# Patient Record
Sex: MALE | Race: WHITE
[De-identification: names, ages, dates, MRNs, and addresses within clinical notes are randomized per-mention and may not be internally consistent; named-entity substitution may affect disease eponyms.]

---

## 2019-01-29 ENCOUNTER — HOSPITAL ENCOUNTER (EMERGENCY)
Dept: HOSPITAL 56 - MW.ED | Age: 34
Discharge: HOME | End: 2019-01-29
Payer: COMMERCIAL

## 2019-01-29 DIAGNOSIS — S01.312A: Primary | ICD-10-CM

## 2019-01-29 DIAGNOSIS — Z23: ICD-10-CM
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
## 2019-01-29 DIAGNOSIS — F17.210: ICD-10-CM

## 2019-01-29 DIAGNOSIS — Z79.899: ICD-10-CM

## 2019-01-29 PROCEDURE — 90715 TDAP VACCINE 7 YRS/> IM: CPT

## 2019-01-29 PROCEDURE — 12011 RPR F/E/E/N/L/M 2.5 CM/<: CPT

## 2019-01-29 PROCEDURE — 99282 EMERGENCY DEPT VISIT SF MDM: CPT

## 2019-01-29 PROCEDURE — 90471 IMMUNIZATION ADMIN: CPT

## 2019-01-29 NOTE — EDM.PDOC
ED HPI GENERAL MEDICAL PROBLEM





- General


Chief Complaint: Laceration


Stated Complaint: INJURY TO EAR


Time Seen by Provider: 01/29/19 14:37





- History of Present Illness


INITIAL COMMENTS - FREE TEXT/NARRATIVE: 


HISTORY AND PHYSICAL:





History of present illness:


Patient is a 33-year-old white male presents with concern of laceration is left 

[place] occurred when he was working underneath a car. He is unsure of his 

tetanus status this is to be determined





Review of systems: 


As per history of present illness and below otherwise all systems reviewed and 

negative.





Past medical history: 


As per history of present illness and as reviewed below otherwise 

noncontributory.





Surgical history: 


As per history of present illness and as reviewed below otherwise 

noncontributory.





Social history: 


No reported history of drug or alcohol abuse.





Family history: 


As per history of present illness and as reviewed below otherwise 

noncontributory.





Physical exam:


HEENT: Patient has approximately 1 cm superficial laceration to his left 

earlobe is good hemostasis, normocephalic, pupils reactive, negative for 

conjunctival pallor or scleral icterus, mucous membranes moist, throat clear, 

neck supple, nontender, trachea midline.


Lungs: Clear to auscultation, breath sounds equal bilaterally, chest nontender.


Heart: S1S2, regular, negative for clicks, rubs, or JVD.


Abdomen: Soft, nondistended, nontender. Negative for masses or 

hepatosplenomegaly. Negative for costovertebral tenderness.


Pelvis: Stable nontender.


Genitourinary: Deferred.


Rectal: Deferred.


Extremities: Atraumatic, negative for cords or calf pain. Neurovascular 

unremarkable.


Neuro: Awake, alert, oriented. Cranial nerves II through XII unremarkable. 

Cerebellum unremarkable. Motor and sensory unremarkable throughout. Exam 

nonfocal.





Diagnostics:


None





Therapeutics:


Wound was irrigated and closed with Dermabond adhesive





Impression: 


#1 left ear injury ( laceration)





Definitive disposition and diagnosis as appropriate pending reevaluation and 

review of above.





  ** Left Ear


Pain Score (Numeric/FACES): 1





- Related Data


 Allergies











Allergy/AdvReac Type Severity Reaction Status Date / Time


 


No Known Allergies Allergy   Verified 01/29/19 13:56











Home Meds: 


 Home Meds





Metoprolol Succinate [Toprol XL] 25 mg PO DAILY 01/29/19 [History]











Past Medical History


Cardiovascular History: Reports: Other (See Below)


Other Cardiovascular History: mitral valve prolapse





- Infectious Disease History


Infectious Disease History: Reports: Chicken Pox





Social & Family History





- Family History


Family Medical History: Noncontributory





- Tobacco Use


Smoking Status *Q: Current Every Day Smoker


Years of Tobacco use: 10


Packs/Tins Daily: 1





- Recreational Drug Use


Recreational Drug Use: No





ED ROS GENERAL





- Review of Systems


Review Of Systems: ROS reveals no pertinent complaints other than HPI.





ED EXAM, SKIN/RASH


Exam: See Below (See dictation)





Course





- Vital Signs


Last Recorded V/S: 





 Last Vital Signs











Temp  36.4 C   01/29/19 13:53


 


Pulse  53 L  01/29/19 13:53


 


Resp  16   01/29/19 13:53


 


BP  128/74   01/29/19 13:53


 


Pulse Ox  97   01/29/19 13:53














Departure





- Departure


Time of Disposition: 14:40


Disposition: Home, Self-Care 01


Condition: Good


Clinical Impression: 


 Laceration








- Discharge Information


Referrals: 


PCP,None [Primary Care Provider] - 


Additional Instructions: 


The following information is given to patients seen in the emergency department 

who are being discharged to home. This information is to outline your options 

for follow-up care. We provide all patients seen in our emergency department 

with a follow-up referral.





The need for follow-up, as well as the timing and circumstances, are variable 

depending upon the specifics of your emergency department visit.





If you don't have a primary care physician on staff, we will provide you with a 

referral. We always advise you to contact your personal physician following an 

emergency department visit to inform them of the circumstance of the visit and 

for follow-up with them and/or the need for any referrals to a consulting 

specialist.





The emergency department will also refer you to a specialist when appropriate. 

This referral assures that you have the opportunity for followup care with a 

specialist. All of these measure are taken in an effort to provide you with 

optimal care, which includes your followup.





Under all circumstances we always encourage you to contact your private 

physician who remains a resource for coordinating  your care. When calling for 

followup care, please make the office aware that this follow-up is from your 

recent emergency room visit. If for any reason you are refused follow-up, 

please contact the [facility] emergency department at [phone] 

and asked to speak to the emergency department charge nurse.

















Follow-up primary medical doctor as needed as discussed return as needed as 

discussed

## 2019-07-08 ENCOUNTER — HOSPITAL ENCOUNTER (EMERGENCY)
Dept: HOSPITAL 56 - MW.ED | Age: 34
Discharge: HOME | End: 2019-07-08
Payer: COMMERCIAL

## 2019-07-08 DIAGNOSIS — F17.210: ICD-10-CM

## 2019-07-08 DIAGNOSIS — R07.89: Primary | ICD-10-CM

## 2019-07-08 DIAGNOSIS — Z79.899: ICD-10-CM

## 2019-07-08 LAB
CHLORIDE SERPL-SCNC: 105 MMOL/L (ref 98–107)
SODIUM SERPL-SCNC: 141 MMOL/L (ref 136–148)

## 2019-07-08 PROCEDURE — 82550 ASSAY OF CK (CPK): CPT

## 2019-07-08 PROCEDURE — 71045 X-RAY EXAM CHEST 1 VIEW: CPT

## 2019-07-08 PROCEDURE — 84484 ASSAY OF TROPONIN QUANT: CPT

## 2019-07-08 PROCEDURE — 96374 THER/PROPH/DIAG INJ IV PUSH: CPT

## 2019-07-08 PROCEDURE — 85025 COMPLETE CBC W/AUTO DIFF WBC: CPT

## 2019-07-08 PROCEDURE — 80053 COMPREHEN METABOLIC PANEL: CPT

## 2019-07-08 PROCEDURE — 93005 ELECTROCARDIOGRAM TRACING: CPT

## 2019-07-08 PROCEDURE — 99285 EMERGENCY DEPT VISIT HI MDM: CPT

## 2019-07-08 PROCEDURE — 96361 HYDRATE IV INFUSION ADD-ON: CPT

## 2019-07-08 NOTE — EDM.PDOC
ED HPI GENERAL MEDICAL PROBLEM





- General


Chief Complaint: Chest Pain


Stated Complaint: CHEST PAIN


Time Seen by Provider: 07/08/19 00:54





- History of Present Illness


INITIAL COMMENTS - FREE TEXT/NARRATIVE: 





HISTORY AND PHYSICAL:





History of present illness:


The patient is a 34-year-old male who presents with complaints of left upper 

outer chest wall pain that started about 3 hours ago at rest. He says that he 

had [place] 

normally and did drink alcohol today and says he came home was resting when the 

pain started. It does not radiate to his arm and is not associated with nausea 

vomiting abdominal pain shortness of breath or diaphoresis. He rates it as a 7/

10 and he did not take any medication other than Tylenol before coming here. 

The patient has a significant past medical history in that he had multiple 

tests done when he was 19 years of age for syncope including stress tests and 

was diagnosed with mitral valve prolapse. Subsequently he also had a Holter 

monitor due to palpitations which was also normal. He has never had a heart 

catheter. He says that approximately one year ago he went to the ER and [place] and was evaluated in the ER but not admitted to the hospital and was told 

that he had formation of his heart muscle and he was placed on some pain 

medications and steroids and he got better. He said that while he was in the ED 

his pain went away completely. He says he has not followed up with his provider 

recently out in [place] nor has his cardiologist. The patient has no 

significant family history and has a social history significant for one pack a 

day smoking but no drug use and daily alcohol use but not in excess. He Has no 

abdominal complaints such as upper abdominal discomfort dyspepsia nausea or 

vomiting and has no history of food intolerance. He says that the pain he is 

experiencing now is very similar to pain he has had in the past but because it 

didn't seem to go away after a short time he thought he should be checked out. 

The pain came on gradually and was not sudden in onset and he describes it as a 

deep aching annoying pain





Review of systems: 


As per history of present illness and below otherwise all systems reviewed and 

negative.





Past medical history: 


As per history of present illness and as reviewed below otherwise 

noncontributory.





Surgical history: 


As per history of present illness and as reviewed below otherwise 

noncontributory.





Social history: 


No reported history of drug or alcohol abuse.





Family history: 


As per history of present illness and as reviewed below otherwise 

noncontributory.





Physical exam:


General: Well-developed well-nourished man who is nontoxic and vital signs are 

noted by me.


HEENT: Atraumatic, normocephalic,  negative for conjunctival pallor or scleral 

icterus, mucous membranes moist, throat clear, neck supple, nontender, trachea 

midline.


Lungs: Clear to auscultation, breath sounds equal bilaterally, there is no bony 

chest wall tenderness defects deformities or crepitus but on palpation of the 

left anterior chest wall in the upper outer quadrant I can reproduce the pain 

not only with palpation but with movement of the arm in full abduction and then 

when I move it across his chest. When I do that and palpate the pectoralis 

muscle he says that that is the exact pain he is experiencing.


Heart: S1S2, regular, negative for clicks, rubs, or JVD. There is no overt 

murmurs appreciated and I cannot appreciate the patient's mitral valve prolapse 

on my exam


Abdomen: Soft, nondistended, nontender. Negative for masses or 

hepatosplenomegaly. NABS


Pelvis: Stable nontender.


Genitourinary: Deferred.


Rectal: Deferred.


Extremities: Atraumatic, negative for cords or calf pain. Neurovascular 

unremarkable. No pedal edema or leg asymmetry


Neuro: Awake, alert, oriented. Cranial nerves II through XII unremarkable. 

Cerebellum unremarkable. Motor and sensory unremarkable throughout. Exam 

nonfocal.





Diagnostics:


EKG chest x-ray CBC CMP troponin CPK





Therapeutics:


IV O2 monitor IV fluids aspirin Toradol





Patient wife at bedside are aware of all testing results and we discussed 

admission versus discharge home with outpatient follow-up and he would point in 

light of the negative testing results. He would like referral to our clinic as 

he has relocated here in [place] and would like to start connecting here and 

do a wellness check as well as located in the outpatient tests if this pain 

returns. He is currently feeling improved.





Impression: 


Left chest wall/atypical pain





Definitive disposition and diagnosis as appropriate pending reevaluation and 

review of above.


  ** chest


Pain Score (Numeric/FACES): 7





- Related Data


 Allergies











Allergy/AdvReac Type Severity Reaction Status Date / Time


 


No Known Allergies Allergy   Verified 07/08/19 00:55











Home Meds: 


 Home Meds





Metoprolol Succinate [Toprol XL] 25 mg PO DAILY 01/29/19 [History]











Past Medical History


Cardiovascular History: Reports: Other (See Below)


Other Cardiovascular History: mitral valve prolapse





- Infectious Disease History


Infectious Disease History: Reports: Chicken Pox





Social & Family History





- Family History


Family Medical History: Noncontributory





- Tobacco Use


Smoking Status *Q: Current Every Day Smoker


Years of Tobacco use: 10


Packs/Tins Daily: 1





- Recreational Drug Use


Recreational Drug Use: No





ED ROS GENERAL





- Review of Systems


Review Of Systems: ROS reveals no pertinent complaints other than HPI.





ED EXAM, GENERAL





- Physical Exam


Exam: See Below (see dictation)





Course





- Vital Signs


Last Recorded V/S: 


 Last Vital Signs











Temp  36.6 C   07/08/19 00:45


 


Pulse  95   07/08/19 00:45


 


Resp  18   07/08/19 00:45


 


BP  113/80   07/08/19 00:45


 


Pulse Ox  98   07/08/19 01:02














- Orders/Labs/Meds


Orders: 


 Active Orders 24 hr











 Category Date Time Status


 


 Cardiac Monitoring [RC] .AS DIRECTED Care  07/08/19 00:55 Active


 


 EKG Documentation Completion [RC] STAT Care  07/08/19 00:55 Active


 


 Oxygen Therapy, ED [RC] ASDIRECTED Care  07/08/19 00:55 Active


 


 Pulse Oximetry [RC] ASDIRECTED Care  07/08/19 00:55 Active


 


 COMPREHENSIVE METABOLIC PN,CMP [CHEM] Stat Lab  07/08/19 00:56 Results


 


 CPK [CREATINE KINASE,CK] [CHEM] Stat Lab  07/08/19 00:56 Results


 


 TROPONIN I [CHEM] Stat Lab  07/08/19 00:56 Results


 


 Sodium Chloride 0.9% [Normal Saline] 1,000 ml Med  07/08/19 01:05 Active





 IV STAT   


 


 Sodium Chloride 0.9% [Saline Flush] Med  07/08/19 00:55 Active





 10 ml FLUSH ASDIRECTED PRN   


 


 Sodium Chloride 0.9% [Saline Flush] Med  07/08/19 00:55 Active





 2.5 ml FLUSH ASDIRECTED PRN   


 


 Saline Lock Insert [OM.PC] Stat Oth  07/08/19 00:55 Ordered








 Medication Orders





Sodium Chloride (Normal Saline)  1,000 mls @ 999 mls/hr IV STAT ONE


   Stop: 07/08/19 02:05


   Last Admin: 07/08/19 01:09  Dose: 999 mls/hr


Sodium Chloride (Saline Flush)  10 ml FLUSH ASDIRECTED PRN


   PRN Reason: Keep Vein Open


Sodium Chloride (Saline Flush)  2.5 ml FLUSH ASDIRECTED PRN


   PRN Reason: Keep Vein Open








Labs: 


 Laboratory Tests











  07/08/19 07/08/19 Range/Units





  00:56 00:56 


 


WBC  12.92 H   (4.0-11.0)  K/uL


 


RBC  4.98   (4.50-5.90)  M/uL


 


Hgb  16.0   (13.0-17.0)  g/dL


 


Hct  45.0   (38.0-50.0)  %


 


MCV  90.4   (80.0-98.0)  fL


 


MCH  32.1 H   (27.0-32.0)  pg


 


MCHC  35.6   (31.0-37.0)  g/dL


 


RDW Std Deviation  40.8   (28.0-62.0)  fl


 


RDW Coeff of Mary Lou  13   (11.0-15.0)  %


 


Plt Count  211   (150-400)  K/uL


 


MPV  10.20   (7.40-12.00)  fL


 


Neut % (Auto)  60.6   (48.0-80.0)  %


 


Lymph % (Auto)  28.2   (16.0-40.0)  %


 


Mono % (Auto)  6.4   (0.0-15.0)  %


 


Eos % (Auto)  4.3   (0.0-7.0)  %


 


Baso % (Auto)  0.5   (0.0-1.5)  %


 


Neut # (Auto)  7.8 H   (1.4-5.7)  K/uL


 


Lymph # (Auto)  3.6 H   (0.6-2.4)  K/uL


 


Mono # (Auto)  0.8   (0.0-0.8)  K/uL


 


Eos # (Auto)  0.6   (0.0-0.7)  K/uL


 


Baso # (Auto)  0.1   (0.0-0.1)  K/uL


 


Sodium   141  (136-148)  mmol/L


 


Potassium   4.1  (3.5-5.1)  mmol/L


 


Chloride   105  ()  mmol/L


 


Carbon Dioxide   20.2 L  (21.0-32.0)  mmol/L


 


BUN   15  (7.0-18.0)  mg/dL


 


Creatinine   1.1  (0.8-1.3)  mg/dL


 


Est Cr Clr Drug Dosing   107.95  mL/min


 


Estimated GFR (MDRD)   > 60.0  ml/min


 


Glucose   100  ()  mg/dL


 


Calcium   8.3 L  (8.5-10.1)  mg/dL


 


Total Bilirubin   0.3  (0.2-1.0)  mg/dL


 


ALT   67 H  (14-63)  IU/L


 


Alkaline Phosphatase   60  ()  U/L


 


Creatine Kinase   359 H  ()  U/L


 


Troponin I   < 0.050  (0.000-0.056)  ng/mL


 


Total Protein   7.9  (6.4-8.2)  g/dL


 


Albumin   4.2  (3.4-5.0)  g/dL


 


Globulin   3.7  (2.6-4.0)  g/dL


 


Albumin/Globulin Ratio   1.1  (0.9-1.6)  











Meds: 


Medications











Generic Name Dose Route Start Last Admin





  Trade Name Freq  PRN Reason Stop Dose Admin


 


Sodium Chloride  1,000 mls @ 999 mls/hr  07/08/19 01:05  07/08/19 01:09





  Normal Saline  IV  07/08/19 02:05  999 mls/hr





  STAT ONE   Administration





     





     





     





     


 


Sodium Chloride  10 ml  07/08/19 00:55  





  Saline Flush  FLUSH   





  ASDIRECTED PRN   





  Keep Vein Open   





     





     





     


 


Sodium Chloride  2.5 ml  07/08/19 00:55  





  Saline Flush  FLUSH   





  ASDIRECTED PRN   





  Keep Vein Open   





     





     





     














Discontinued Medications














Generic Name Dose Route Start Last Admin





  Trade Name Freq  PRN Reason Stop Dose Admin


 


Aspirin  324 mg  07/08/19 01:04  07/08/19 01:10





  Aspirin  PO  07/08/19 01:05  324 mg





  ONETIME ONE   Administration





     





     





     





     


 


Ketorolac Tromethamine  30 mg  07/08/19 01:04  07/08/19 01:09





  Toradol  IVPUSH  07/08/19 01:05  30 mg





  ONETIME ONE   Administration





     





     





     





     














Departure





- Departure


Time of Disposition: 01:51


Disposition: Home, Self-Care 01


Condition: Good


Clinical Impression: 


 Atypical chest pain, Chest wall pain








- Discharge Information


Forms:  ED Department Discharge


Additional Instructions: 


The following information is given to patients seen in the emergency department 

who are being discharged to home. This information is to outline your options 

for follow-up care. We provide all patients seen in our emergency department 

with a follow-up referral.





The need for follow-up, as well as the timing and circumstances, are variable 

depending upon the specifics of your emergency department visit.





If you don't have a primary care physician on staff, we will provide you with a 

referral. We always advise you to contact your personal physician following an 

emergency department visit to inform them of the circumstance of the visit and 

for follow-up with them and/or the need for any referrals to a consulting 

specialist.





The emergency department will also refer you to a specialist when appropriate. 

This referral assures that you have the opportunity for followup care with a 

specialist. All of these measure are taken in an effort to provide you with 

optimal care, which includes your followup.





Under all circumstances we always encourage you to contact your private 

physician who remains a resource for coordinating  your care. When calling for 

followup care, please make the office aware that this follow-up is from your 

recent emergency room visit. If for any reason you are refused follow-up, 

please contact the [facility] emergency 

department at [phone] and ask to speak to the emergency department 

charge nurse.





[facility] 


Primary care- [place] [address]


[phone]








Please call and schedule a follow-up appointment either with your provider in 

[place] or one of ours for reevaluation and further care. Push hydration 

and return to ER as needed and as discussed





- My Orders


Last 24 Hours: 


My Active Orders





07/08/19 00:55


Cardiac Monitoring [RC] .AS DIRECTED 


EKG Documentation Completion [RC] STAT 


Oxygen Therapy, ED [RC] ASDIRECTED 


Pulse Oximetry [RC] ASDIRECTED 


Sodium Chloride 0.9% [Saline Flush]   10 ml FLUSH ASDIRECTED PRN 


Sodium Chloride 0.9% [Saline Flush]   2.5 ml FLUSH ASDIRECTED PRN 


Saline Lock Insert [OM.PC] Stat 





07/08/19 00:56


COMPREHENSIVE METABOLIC PN,CMP [CHEM] Stat 


CPK [CREATINE KINASE,CK] [CHEM] Stat 


TROPONIN I [CHEM] Stat 





07/08/19 01:05


Sodium Chloride 0.9% [Normal Saline] 1,000 ml IV STAT 














- Assessment/Plan


Last 24 Hours: 


My Active Orders





07/08/19 00:55


Cardiac Monitoring [RC] .AS DIRECTED 


EKG Documentation Completion [RC] STAT 


Oxygen Therapy, ED [RC] ASDIRECTED 


Pulse Oximetry [RC] ASDIRECTED 


Sodium Chloride 0.9% [Saline Flush]   10 ml FLUSH ASDIRECTED PRN 


Sodium Chloride 0.9% [Saline Flush]   2.5 ml FLUSH ASDIRECTED PRN 


Saline Lock Insert [OM.PC] Stat 





07/08/19 00:56


COMPREHENSIVE METABOLIC PN,CMP [CHEM] Stat 


CPK [CREATINE KINASE,CK] [CHEM] Stat 


TROPONIN I [CHEM] Stat 





07/08/19 01:05


Sodium Chloride 0.9% [Normal Saline] 1,000 ml IV STAT

## 2019-07-08 NOTE — CR
INDICATION: 1 image. no prior. chest pain



TECHNIQUE:



Chest 1 view. 



COMPARISON:



None. 



FINDINGS:



Cardiovascular and mediastinum: Heart size and vasculature are normal in 

caliber and appearance.  Mediastinum is within normal limits.  



Lungs and pleural space: Lungs are clear.  No sign of infiltrate or mass.  

No sign of pleural effusion.  No pneumothorax.  



Bones and soft tissues: No significant findings.  



IMPRESSION:



Unremarkable chest.



Dictated by: Sebas Garces MD @ 07/08/2019 01:15:41



(Electronically Signed)